# Patient Record
Sex: FEMALE | Race: BLACK OR AFRICAN AMERICAN | NOT HISPANIC OR LATINO | Employment: FULL TIME | URBAN - METROPOLITAN AREA
[De-identification: names, ages, dates, MRNs, and addresses within clinical notes are randomized per-mention and may not be internally consistent; named-entity substitution may affect disease eponyms.]

---

## 2017-11-26 ENCOUNTER — HOSPITAL ENCOUNTER (EMERGENCY)
Facility: HOSPITAL | Age: 42
Discharge: HOME | End: 2017-11-26
Attending: EMERGENCY MEDICINE

## 2017-11-26 VITALS
BODY MASS INDEX: 40.16 KG/M2 | HEART RATE: 90 BPM | OXYGEN SATURATION: 97 % | WEIGHT: 265 LBS | HEIGHT: 68 IN | DIASTOLIC BLOOD PRESSURE: 91 MMHG | TEMPERATURE: 98.5 F | RESPIRATION RATE: 27 BRPM | SYSTOLIC BLOOD PRESSURE: 147 MMHG

## 2017-11-26 DIAGNOSIS — T78.40XA ALLERGIC REACTION: Primary | ICD-10-CM

## 2017-11-26 LAB
ATRIAL RATE: 90 BPM
P AXIS: 61 DEGREES
PR INTERVAL: 184 MS
QRS AXIS: 67 DEGREES
QRSD INTERVAL: 70 MS
QT INTERVAL: 330 MS
QTC INTERVAL: 403 MS
T WAVE AXIS: 65 DEGREES
VENTRICULAR RATE: 90 BPM

## 2017-11-26 PROCEDURE — 99285 EMERGENCY DEPT VISIT HI MDM: CPT

## 2017-11-26 PROCEDURE — 93005 ELECTROCARDIOGRAM TRACING: CPT

## 2017-11-26 PROCEDURE — 96374 THER/PROPH/DIAG INJ IV PUSH: CPT

## 2017-11-26 RX ORDER — EPINEPHRINE 0.3 MG/.3ML
0.3 INJECTION SUBCUTANEOUS ONCE
Qty: 0.3 ML | Refills: 0 | Status: SHIPPED | OUTPATIENT
Start: 2017-11-26 | End: 2017-11-26

## 2017-11-26 RX ORDER — METHYLPREDNISOLONE SODIUM SUCCINATE 125 MG/2ML
60 INJECTION, POWDER, LYOPHILIZED, FOR SOLUTION INTRAMUSCULAR; INTRAVENOUS ONCE
Status: COMPLETED | OUTPATIENT
Start: 2017-11-26 | End: 2017-11-26

## 2017-11-26 RX ORDER — EPINEPHRINE 1 MG/ML
0.3 INJECTION, SOLUTION, CONCENTRATE INTRAVENOUS ONCE
Status: DISCONTINUED | OUTPATIENT
Start: 2017-11-26 | End: 2017-11-26 | Stop reason: HOSPADM

## 2017-11-26 RX ORDER — EPINEPHRINE 1 MG/ML(1)
AMPUL (ML) INJECTION
Status: DISCONTINUED
Start: 2017-11-26 | End: 2017-11-26 | Stop reason: HOSPADM

## 2017-11-26 RX ORDER — DIPHENHYDRAMINE HYDROCHLORIDE 50 MG/ML
INJECTION INTRAMUSCULAR; INTRAVENOUS
Status: DISCONTINUED
Start: 2017-11-26 | End: 2017-11-26 | Stop reason: HOSPADM

## 2017-11-26 RX ORDER — DIPHENHYDRAMINE HYDROCHLORIDE 50 MG/ML
25 INJECTION INTRAMUSCULAR; INTRAVENOUS ONCE
Status: DISCONTINUED | OUTPATIENT
Start: 2017-11-26 | End: 2017-11-26 | Stop reason: HOSPADM

## 2017-11-26 RX ORDER — DIPHENHYDRAMINE HYDROCHLORIDE 50 MG/ML
25 INJECTION INTRAMUSCULAR; INTRAVENOUS ONCE
Status: DISCONTINUED | OUTPATIENT
Start: 2017-11-26 | End: 2017-11-26

## 2017-11-26 RX ADMIN — METHYLPREDNISOLONE SODIUM SUCCINATE 60 MG: 125 INJECTION, POWDER, FOR SOLUTION INTRAMUSCULAR; INTRAVENOUS at 14:50

## 2017-11-26 NOTE — ED ATTENDING ATTESTATION
Ja Giron MD, saw and evaluated the patient  I have discussed the patient with the resident/non-physician practitioner and agree with the resident's/non-physician practitioner's findings, Plan of Care, and MDM as documented in the resident's/non-physician practitioner's note, except where noted  All available labs and Radiology studies were reviewed  At this point I agree with the current assessment done in the Emergency Department  I have conducted an independent evaluation of this patient a history and physical is as follows: Ate desert with peanuts in it and feels ches pressure  Took 50 benadryl PO  Looks well no rash hives or wheezing   Lungs clear, oropharynx benign      Critical Care Time  CritCare Time

## 2017-11-26 NOTE — DISCHARGE INSTRUCTIONS
Follow-up with your primary care doctor in 24-48 hours  Return immediately with any worsening shortness of breath, chest discomfort, chest tightness, nausea, vomiting, diarrhea or abdominal pain over the presents for rash  He will be provided with an epinephrine pen    Food Allergy   WHAT YOU NEED TO KNOW:   A food allergy is an immune system reaction to a food  A food allergen is an ingredient or chemical in a food that causes your immune system to react  Allergic reactions happen when your immune system fights too strongly against an allergen and causes you to get sick  Allergic reactions can happen within minutes to several hours after you eat, touch, or smell the food  You can also have a second reaction up to 8 hours later  DISCHARGE INSTRUCTIONS:   Call 911 for signs or symptoms of anaphylaxis, such as trouble breathing, swelling in your mouth or throat, or wheezing  You may also have itching, a rash, hives, or feel like you are going to faint  Return to the emergency department if:   · Your mouth, tongue, or throat swells  · You have itching or hives that spread all over your body  Contact your healthcare provider if:   · You have new or worsening rashes, hives, or itching  · You have an upset stomach or are vomiting  · You have stomach cramps or diarrhea  · You have questions about your treatment, medicine, or care  Medicines:   · Epinephrine is used to treat severe allergic reactions such as anaphylaxis  · Antihistamines decrease mild symptoms such as itching or a rash  · Steroids: This medicine may be given to decrease inflammation  · Short-acting bronchodilators: You may need short-acting bronchodilators to help open your airways quickly  These medicines may be called rescue inhalers or relievers  They relieve sudden, severe symptoms and start to work right away  · Take your medicine as directed   Contact your healthcare provider if you think your medicine is not helping or if you have side effects  Tell him or her if you are allergic to any medicine  Keep a list of the medicines, vitamins, and herbs you take  Include the amounts, and when and why you take them  Bring the list or the pill bottles to follow-up visits  Carry your medicine list with you in case of an emergency  Follow up with your healthcare provider as directed: You may need to see specialists for ongoing care  Your healthcare provider may want to test you regularly to see if the food allergy changes  Write down your questions so you remember to ask them during follow-up visits  Steps to take for signs or symptoms of anaphylaxis:   · Immediately give 1 shot of epinephrine only into the outer thigh muscle  · Leave the shot in place as directed  Your healthcare provider may recommend you leave it in place for up to 10 seconds before you remove it  This helps make sure all of the epinephrine is delivered  · Call 911 and go to the emergency department, even if the shot improved symptoms  Do not drive yourself  Bring the used epinephrine shot with you  Safety precautions to take if you are at risk for anaphylaxis:   · Keep 2 shots of epinephrine with you at all times  You may need a second shot, because epinephrine only works for about 20 minutes and symptoms may return  Your healthcare provider can show you and family members how to give the shot  Check the expiration date every month and replace it before it expires  · Create an action plan  Your healthcare provider can help you create a written plan that explains the allergy and an emergency plan to treat a reaction  The plan explains when to give a second epinephrine shot if symptoms return or do not improve after the first  Give copies of the action plan and emergency instructions to family members, work and school staff, and  providers  Show them how to give a shot of epinephrine  Update the plan as the allergy changes  · Be careful when you exercise   If you have had exercise-induced anaphylaxis, do not exercise right after you eat  Stop exercising right away if you start to develop any signs or symptoms of anaphylaxis  You may first feel tired, warm, or have itchy skin  Hives, swelling, and severe breathing problems may develop if you continue to exercise  · Carry medical alert identification  Wear jewelry or carry a card that says you have a food allergy  Ask your healthcare provider where to get these items  · Do not eat the food that causes your allergy  Even a small taste can cause an allergic reaction  Your healthcare provider or a dietitian can help you plan a balanced diet  Babies may need to drink a formula that does not contain milk or soy  A dietitian can teach you how to read labels for ingredients that cause your allergies  · Ask about ingredients in foods prepared outside your home  When you eat out, ask what is in the food you want to order  Ask how food is prepared  Fried foods may contain small amounts of food allergens, such as nuts and shellfish  · Use good hygiene  Do not share utensils or food  Wash your hands before and after meals  Flu vaccine and egg allergy: Do not get the nasal spray form of the flu vaccine if you have an egg allergy  The nasal spray may contain egg proteins that can cause anaphylaxis  Ask your healthcare provider if the injection form of the vaccine is safe for you  © 2017 [address]  Information is for End User's use only and may not be sold, redistributed or otherwise used for commercial purposes  All illustrations and images included in CareNotes® are the copyrighted property of [employer]  or [address]  The above information is an  only  It is not intended as medical advice for individual conditions or treatments   Talk to your doctor, nurse or pharmacist before following any medical regimen to see if it is safe and effective for you

## 2017-11-26 NOTE — ED PROVIDER NOTES
History  Chief Complaint   Patient presents with    Allergic Reaction - Major     Pt c/o SOB after eating ice cream that had peanuts     72-year-old female with a reported history of a peanut allergy presents for evaluation of peanut exposure and chest tightness  Symptoms began 20 minutes prior to arrival after the patient ate a desert with peanut butter in the ingredients  10 minutes after being told there was penis within her dessert she began to experience chest tightness  She denied any throat swelling, nausea, vomiting, abdominal pain, hives or any other symptoms  She reports 1 previous episode of hospitalizations secondary to pain exposure  She does have an EpiPen but says it is  and did not use it prior to arrival   She did administer 50 mg of p o  Benadryl prior to arrival   On exam she has normal vital signs saturating 99% with 18 respirations per minute, no increased respiratory effort able to talk without any shortness of breath  Heart regular without murmur, lungs clear with equal breath sounds bilaterally, no wheezing  Posterior pharynx is unremarkable, no facial or tongue swelling  Abdomen soft nontender  No rashes  None       History reviewed  No pertinent past medical history  History reviewed  No pertinent surgical history  History reviewed  No pertinent family history  I have reviewed and agree with the history as documented  Social History   Substance Use Topics    Smoking status: Current Some Day Smoker    Smokeless tobacco: Never Used    Alcohol use 1 8 oz/week     3 Standard drinks or equivalent per week        Review of Systems   Constitutional: Negative for chills, fatigue and fever  HENT: Negative for congestion  Eyes: Negative for visual disturbance  Respiratory: Positive for chest tightness  Negative for shortness of breath  Cardiovascular: Negative for chest pain and palpitations     Gastrointestinal: Negative for abdominal pain, nausea and vomiting  Genitourinary: Negative for dysuria and flank pain  Musculoskeletal: Negative for back pain and gait problem  Skin: Negative for rash  Allergic/Immunologic: Positive for food allergies  Neurological: Negative for dizziness, syncope, weakness, light-headedness, numbness and headaches  Hematological: Negative for adenopathy  Psychiatric/Behavioral: Negative for agitation  The patient is nervous/anxious  Physical Exam  ED Triage Vitals [11/26/17 1427]   Temperature Pulse Respirations Blood Pressure SpO2   98 5 °F (36 9 °C) 93 18 (!) 168/108 99 %      Temp Source Heart Rate Source Patient Position - Orthostatic VS BP Location FiO2 (%)   Oral Monitor -- -- --      Pain Score       No Pain           Orthostatic Vital Signs  Vitals:    11/26/17 1427 11/26/17 1521 11/26/17 1621   BP: (!) 168/108 128/81 147/91   Pulse: 93 90        Physical Exam   Constitutional: She is oriented to person, place, and time  Vital signs are normal  She appears well-developed and well-nourished  She does not appear ill  No distress  HENT:   Head: Normocephalic and atraumatic  Head is without abrasion and without contusion  Right Ear: Tympanic membrane normal    Left Ear: Tympanic membrane normal    Nose: Nose normal    Mouth/Throat: Uvula is midline, oropharynx is clear and moist and mucous membranes are normal    Eyes: Conjunctivae and EOM are normal  Pupils are equal, round, and reactive to light  Neck: Trachea normal, normal range of motion, full passive range of motion without pain and phonation normal  Neck supple  No JVD present  No spinous process tenderness and no muscular tenderness present  Carotid bruit is not present  Normal range of motion present  No thyromegaly present  Cardiovascular: Normal rate, regular rhythm and intact distal pulses  Exam reveals no friction rub  No murmur heard  Pulmonary/Chest: Effort normal and breath sounds normal  No accessory muscle usage or stridor   No tachypnea  No respiratory distress  She has no decreased breath sounds  She has no wheezes  She has no rhonchi  She has no rales  She exhibits no tenderness, no crepitus, no edema and no retraction  Abdominal: Soft  Normal appearance and bowel sounds are normal  She exhibits no distension  There is no tenderness  There is no rigidity, no rebound, no guarding and no CVA tenderness  Musculoskeletal: Normal range of motion  Lymphadenopathy:     She has no cervical adenopathy  Neurological: She is alert and oriented to person, place, and time  She has normal strength  No sensory deficit  GCS eye subscore is 4  GCS verbal subscore is 5  GCS motor subscore is 6  Skin: Skin is warm, dry and intact  No rash noted  She is not diaphoretic  Psychiatric: She has a normal mood and affect  Nursing note and vitals reviewed  ED Medications  Medications   methylPREDNISolone sodium succinate (Solu-MEDROL) injection 60 mg (60 mg Intravenous Given 11/26/17 1450)       Diagnostic Studies  Results Reviewed     None                 No orders to display         Procedures  Procedures      Phone Consults  ED Phone Contact    ED Course  ED Course as of Nov 27 1441   Sun Nov 26, 2017   1438 Reassessment:  Patient resting comfortably in bed subjectively feels symptoms improving  No wheezing on exam    1447 Reassessment:  Patient continues to feel approved of symptoms  Resting comfortably in bed talking without discomfort or distress    1454 EKG:  Normal sinus rhythm, rate 90, no ST or T-wave abnormalities  Normal intervals  1503 Reassessment:  Patient continues to rest comfortably in bed, no respiratory distress or wheezing  [id number] Patient reports she feels at baseline, requesting discharge, no respiratory symptoms, discussed return precautions patient agreeable, EpiPen prescription provided                                [facility]  CritCare Time    Disposition  Final diagnoses:    Allergic reaction     Time reflects when diagnosis was documented in both MDM as applicable and the Disposition within this note     Time User Action Codes Description Comment    11/26/2017  4:18 PM Kennedy Ventura Add [T78 [id number]] Allergic reaction       ED Disposition     ED Disposition Condition Comment    Discharge  Preet Michael discharge to home/self care  Condition at discharge: Good        Follow-up Information     Follow up With Specialties Details Why Contact Info Additional [address] Emergency Department Emergency Medicine Go to If symptoms worsen [address]  [phone]  ED, [address] [place] [address], [address] [place] [address]  Schedule an appointment as soon as possible for a visit in 2 days As needed [address] [facility] [address]  [phone]           Discharge Medication List as of 11/26/2017  4:37 PM      START taking these medications    Details   EPINEPHrine (EPIPEN 2-DELROY) 0 3 mg/0 3 mL SOAJ Inject 0 3 mL into the shoulder, thigh, or buttocks once for 1 dose, Starting Sun 11/26/2017, Print           No discharge procedures on file  ED Provider  Attending physically available and evaluated Jeremiahncbernie Michael  JAIME managed the patient along with the ED Attending      Electronically Signed by         Mary Berger DO  Resident  11/27/17 5375